# Patient Record
Sex: MALE | Race: WHITE | Employment: FULL TIME | URBAN - NONMETROPOLITAN AREA
[De-identification: names, ages, dates, MRNs, and addresses within clinical notes are randomized per-mention and may not be internally consistent; named-entity substitution may affect disease eponyms.]

---

## 2021-03-25 ENCOUNTER — HOSPITAL ENCOUNTER (EMERGENCY)
Age: 46
Discharge: HOME | End: 2021-03-25
Payer: COMMERCIAL

## 2021-03-25 VITALS
WEIGHT: 270 LBS | OXYGEN SATURATION: 94 % | HEART RATE: 98 BPM | RESPIRATION RATE: 16 BRPM | SYSTOLIC BLOOD PRESSURE: 133 MMHG | TEMPERATURE: 97.8 F | DIASTOLIC BLOOD PRESSURE: 91 MMHG | BODY MASS INDEX: 35.78 KG/M2 | HEIGHT: 73 IN

## 2021-03-25 DIAGNOSIS — J02.9 ACUTE VIRAL PHARYNGITIS: Primary | ICD-10-CM

## 2021-03-25 LAB
GROUP A STREP CULTURE, REFLEX: NEGATIVE
REFLEX THROAT C + S: NORMAL

## 2021-03-25 PROCEDURE — 99202 OFFICE O/P NEW SF 15 MIN: CPT | Performed by: NURSE PRACTITIONER

## 2021-03-25 PROCEDURE — 87070 CULTURE OTHR SPECIMN AEROBIC: CPT

## 2021-03-25 PROCEDURE — 99202 OFFICE O/P NEW SF 15 MIN: CPT

## 2021-03-25 PROCEDURE — 87880 STREP A ASSAY W/OPTIC: CPT

## 2021-03-25 RX ORDER — LISINOPRIL 10 MG/1
10 TABLET ORAL DAILY
COMMUNITY

## 2021-03-25 ASSESSMENT — ENCOUNTER SYMPTOMS
RHINORRHEA: 0
TROUBLE SWALLOWING: 0
SORE THROAT: 1
COUGH: 0
NAUSEA: 0
EYE DISCHARGE: 0
DIARRHEA: 0
EYE REDNESS: 0
VOMITING: 0
SHORTNESS OF BREATH: 0

## 2021-03-25 NOTE — ED PROVIDER NOTES
[address] [facility] Encounter      [facility]       Chief Complaint   Patient presents with    Pharyngitis       Nurses Notes reviewed and I agree except as noted in the HPI. HISTORY OF PRESENT ILLNESS   Valentin Rolon is a 50 y.o. male who presents with c/o sore throat. Onset yesterday, unchanged. No sore throat at this time. Pain radiates towards bilateral ears. No otorrhea. No ill exposure. No treatment prior to arrival.    REVIEW OF SYSTEMS     Review of Systems   Constitutional: Negative for chills, diaphoresis, fatigue and fever. HENT: Positive for sore throat. Negative for congestion, ear pain, rhinorrhea and trouble swallowing. Eyes: Negative for discharge and redness. Respiratory: Negative for cough and shortness of breath. Cardiovascular: Negative for chest pain. Gastrointestinal: Negative for diarrhea, nausea and vomiting. Genitourinary: Negative for decreased urine volume. Musculoskeletal: Negative for neck pain and neck stiffness. Skin: Negative for rash. Neurological: Negative for headaches. Hematological: Negative for adenopathy. Psychiatric/Behavioral: Negative for sleep disturbance. PAST MEDICAL HISTORY         Diagnosis Date    Hypertension        SURGICAL HISTORY     Patient  has no past surgical history on file. CURRENT MEDICATIONS       Discharge Medication List as of 3/25/2021  5:35 PM      CONTINUE these medications which have NOT CHANGED    Details   lisinopril (PRINIVIL;ZESTRIL) 10 MG tablet Take 10 mg by mouth dailyHistorical Med             ALLERGIES     Patient is has No Known Allergies. FAMILY HISTORY     Patient'sfamily history is not on file. SOCIAL HISTORY     Patient  reports that he has never smoked. He has never used smokeless tobacco. He reports current alcohol use. He reports that he does not use drugs.     PHYSICAL EXAM     ED TRIAGE VITALS  BP: (!) 133/91, Temp: 97.8 °F (36.6 °C), Pulse: 98, Resp: 16, SpO2: 94 %  Physical Exam  Vitals signs and nursing note reviewed. Constitutional:       General: He is not in acute distress. Appearance: Normal appearance. He is well-developed. He is not ill-appearing, toxic-appearing or diaphoretic. HENT:      Head: Normocephalic and atraumatic. Jaw: No trismus. Right Ear: Hearing, tympanic membrane, ear canal and external ear normal. No mastoid tenderness. No hemotympanum. Tympanic membrane is not perforated, erythematous or bulging. Left Ear: Hearing, tympanic membrane, ear canal and external ear normal. No mastoid tenderness. No hemotympanum. Tympanic membrane is not perforated, erythematous or bulging. Nose: Nose normal.      Mouth/Throat:      Mouth: Mucous membranes are moist.      Pharynx: Oropharynx is clear. Uvula midline. Tonsils: No tonsillar abscesses. Eyes:      General: No scleral icterus. Conjunctiva/sclera: Conjunctivae normal.   Neck:      Musculoskeletal: Normal range of motion and neck supple. Thyroid: No thyromegaly. Trachea: Trachea normal.   Cardiovascular:      Rate and Rhythm: Normal rate and regular rhythm. No extrasystoles are present. Chest Wall: PMI is not displaced. Heart sounds: Normal heart sounds. No murmur. No friction rub. No gallop. Pulmonary:      Effort: Pulmonary effort is normal. No accessory muscle usage or respiratory distress. Breath sounds: Normal breath sounds. Lymphadenopathy:      Head:      Right side of head: No submental, submandibular, tonsillar, preauricular, posterior auricular or occipital adenopathy. Left side of head: No submental, submandibular, tonsillar, preauricular, posterior auricular or occipital adenopathy. Cervical: No cervical adenopathy. Upper Body:      Right upper body: No supraclavicular adenopathy. Left upper body: No supraclavicular adenopathy. Skin:     General: Skin is warm and dry.       Coloration:

## 2021-03-28 LAB — THROAT/NOSE CULTURE: NORMAL

## 2023-10-24 ENCOUNTER — OFFICE VISIT (OUTPATIENT)
Dept: SLEEP MEDICINE | Facility: HOSPITAL | Age: 48
End: 2023-10-24
Payer: COMMERCIAL

## 2023-10-24 VITALS
HEIGHT: 72 IN | OXYGEN SATURATION: 95 % | HEART RATE: 106 BPM | SYSTOLIC BLOOD PRESSURE: 138 MMHG | BODY MASS INDEX: 41.31 KG/M2 | WEIGHT: 305 LBS | DIASTOLIC BLOOD PRESSURE: 96 MMHG

## 2023-10-24 DIAGNOSIS — I10 ESSENTIAL HYPERTENSION: ICD-10-CM

## 2023-10-24 DIAGNOSIS — F41.9 ANXIETY: ICD-10-CM

## 2023-10-24 DIAGNOSIS — D45 POLYCYTHEMIA VERA: ICD-10-CM

## 2023-10-24 DIAGNOSIS — G47.33 OBSTRUCTIVE SLEEP APNEA: Primary | ICD-10-CM

## 2023-10-24 DIAGNOSIS — E66.01 MORBID OBESITY: ICD-10-CM

## 2023-10-24 PROCEDURE — G0463 HOSPITAL OUTPT CLINIC VISIT: HCPCS

## 2023-10-24 NOTE — PROGRESS NOTES
SLEEP/PULMONARY  CLINIC NOTE      PATIENT IDENTIFICATION:  Name: Vin Vazquez  Age: 48 y.o.  Sex: male  :  1975  MRN: [id number]    DATE OF CONSULTATION:  10/24/2023                     CHIEF COMPLAINT: ALIA    History of Present Illness:   Vin Vazquez is a 48 y.o. male Pt with still multiple wakening up at night with sleepiness fatigue and snoring, witnessed apnea, Hard  to get up in the morning. Daytime fatigue sleepiness loss of energy, Union score of (10 )   That was on CPAP machine, and currently is was not feeling good more tired more fatigues even with using it and so he stopped using it, and he could not get any improvement    Review of Systems:   Constitutional: As above   Eyes: negative   ENT/oropharynx: negative   Cardiovascular: negative   Respiratory: As above   Gastrointestinal: negative   Genitourinary: negative   Neurological: negative   Musculoskeletal: negative   Integument/breast: negative   Endocrine: negative   Allergic/Immunologic: negative     Past Medical History:  No past medical history on file.    Past Surgical History:  No past surgical history on file.     Family History:  No family history on file.     Social History:   Social History     Tobacco Use    Smoking status: Not on file    Smokeless tobacco: Not on file   Substance Use Topics    Alcohol use: Not on file        Allergies:  Not on File    Home Meds:  (Not in a hospital admission)      Objective:    Vitals Ranges:   Heart Rate:  [106] 106  BP: (138)/(96) 138/96  Body mass index is 41.37 kg/m².     Exam:  General Appearance:  WDWN    HEENT:   without obvious abnormality,  Conjunctiva/corneas clear,  Normal external ear canals, no drainage    Clear orsalmucosa,  Mallampati score 3    Neck:  Supple, symmetrical, trachea midline. No JVD.  Lungs:   Bilateral basal rhonchi bilaterally, respirations unlabored symmetrical wall movement.    Chest wall:  No tenderness or deformity.    Heart:  Regular rate and rhythm, S1  and S2 normal.  Extremities: Trace edema no clubbing or Cyanosis        Data Review:  All labs (24hrs): No results found for this or any previous visit (from the past 24 hour(s)).     Imaging:  No image results found.       ASSESSMENT:  Diagnoses and all orders for this visit:    Obstructive sleep apnea    Essential hypertension    Morbid obesity    Polycythemia vera    Anxiety        PLAN:    This patient with obstructive sleep apnea, compliance is improved. Encourage to use it more frequent, I re-emphasized on pt the long and short term benefit of treating ALIA. The device is benefiting the patient.  The patient is also instructed to get the CPAP supplies from the [facility] and see me in 1 year for follow-up.    Treating ALIA will improve BP control and polycythemia vera       Follow-up after study    Sarah Muro MD. D, ABSM.  10/24/2023  11:49 EDT

## 2023-10-31 ENCOUNTER — HOSPITAL ENCOUNTER (OUTPATIENT)
Dept: SLEEP MEDICINE | Facility: HOSPITAL | Age: 48
End: 2023-10-31
Payer: COMMERCIAL

## 2023-10-31 DIAGNOSIS — G47.33 OBSTRUCTIVE SLEEP APNEA: ICD-10-CM

## 2023-10-31 PROCEDURE — 95811 POLYSOM 6/>YRS CPAP 4/> PARM: CPT

## 2023-11-10 ENCOUNTER — TELEPHONE (OUTPATIENT)
Dept: SLEEP MEDICINE | Facility: HOSPITAL | Age: 48
End: 2023-11-10
Payer: COMMERCIAL

## 2023-11-10 NOTE — TELEPHONE ENCOUNTER
Called patient with sleep study results. Sending order to [facility]. Compliance visit scheduled

## 2024-02-05 ENCOUNTER — TELEPHONE (OUTPATIENT)
Dept: SLEEP MEDICINE | Facility: HOSPITAL | Age: 49
End: 2024-02-05
Payer: COMMERCIAL

## 2024-02-05 NOTE — TELEPHONE ENCOUNTER
Patient was scheduled for his compliance visit 2/6/2023 - patient called and rescheduled to 2/20/2023.   Patient advised he is sick and running a fever. Does not want to effect anyone else. Emailed patient STEPHANIE Bonilla to advise.